# Patient Record
Sex: MALE | Race: BLACK OR AFRICAN AMERICAN | URBAN - METROPOLITAN AREA
[De-identification: names, ages, dates, MRNs, and addresses within clinical notes are randomized per-mention and may not be internally consistent; named-entity substitution may affect disease eponyms.]

---

## 2021-09-21 ENCOUNTER — OFFICE VISIT (OUTPATIENT)
Dept: URBAN - METROPOLITAN AREA CLINIC 82 | Facility: CLINIC | Age: 81
End: 2021-09-21
Payer: MEDICARE

## 2021-09-21 ENCOUNTER — WEB ENCOUNTER (OUTPATIENT)
Dept: URBAN - METROPOLITAN AREA CLINIC 82 | Facility: CLINIC | Age: 81
End: 2021-09-21

## 2021-09-21 ENCOUNTER — LAB OUTSIDE AN ENCOUNTER (OUTPATIENT)
Dept: URBAN - METROPOLITAN AREA CLINIC 82 | Facility: CLINIC | Age: 81
End: 2021-09-21

## 2021-09-21 VITALS
WEIGHT: 168.6 LBS | RESPIRATION RATE: 16 BRPM | HEART RATE: 112 BPM | BODY MASS INDEX: 31.83 KG/M2 | DIASTOLIC BLOOD PRESSURE: 76 MMHG | TEMPERATURE: 97.2 F | SYSTOLIC BLOOD PRESSURE: 151 MMHG | HEIGHT: 61 IN

## 2021-09-21 DIAGNOSIS — R10.84 ABDOMINAL CRAMPING, GENERALIZED: ICD-10-CM

## 2021-09-21 DIAGNOSIS — R19.7 DIARRHEA: ICD-10-CM

## 2021-09-21 DIAGNOSIS — D50.8 ACQUIRED IRON DEFICIENCY ANEMIA DUE TO DECREASED ABSORPTION: ICD-10-CM

## 2021-09-21 PROCEDURE — G8417 CALC BMI ABV UP PARAM F/U: HCPCS | Performed by: INTERNAL MEDICINE

## 2021-09-21 PROCEDURE — 1036F TOBACCO NON-USER: CPT | Performed by: INTERNAL MEDICINE

## 2021-09-21 PROCEDURE — G9903 PT SCRN TBCO ID AS NON USER: HCPCS | Performed by: INTERNAL MEDICINE

## 2021-09-21 PROCEDURE — 99244 OFF/OP CNSLTJ NEW/EST MOD 40: CPT | Performed by: INTERNAL MEDICINE

## 2021-09-21 PROCEDURE — 99204 OFFICE O/P NEW MOD 45 MIN: CPT | Performed by: INTERNAL MEDICINE

## 2021-09-21 PROCEDURE — G8427 DOCREV CUR MEDS BY ELIG CLIN: HCPCS | Performed by: INTERNAL MEDICINE

## 2021-09-21 RX ORDER — TRAZODONE HYDROCHLORIDE 50 MG/1
TABLET ORAL
Qty: 0 | Refills: 0 | Status: ACTIVE | COMMUNITY
Start: 1900-01-01

## 2021-09-21 RX ORDER — HYDRALAZINE HYDROCHLORIDE 25 MG/1
TABLET ORAL
Qty: 0 | Refills: 0 | Status: ACTIVE | COMMUNITY
Start: 1900-01-01

## 2021-09-21 RX ORDER — POLYETHYLENE GLYCOL-3350 AND ELECTROLYTES 236; 6.74; 5.86; 2.97; 22.74 G/274.31G; G/274.31G; G/274.31G; G/274.31G; G/274.31G
AS DIRECTED POWDER, FOR SOLUTION ORAL ONCE
Qty: 1 | Refills: 0 | OUTPATIENT
Start: 2021-09-21 | End: 2021-09-22

## 2021-09-21 RX ORDER — LEVOTHYROXINE SODIUM 0.05 MG/1
TABLET ORAL
Qty: 0 | Refills: 0 | Status: ACTIVE | COMMUNITY
Start: 1900-01-01

## 2021-09-21 RX ORDER — PANTOPRAZOLE SODIUM 40 MG/1
TABLET, DELAYED RELEASE ORAL
Qty: 0 | Refills: 0 | Status: ACTIVE | COMMUNITY
Start: 1900-01-01

## 2021-09-21 RX ORDER — ACETAMINOPHEN 325 MG/1
TABLET, FILM COATED ORAL
Qty: 0 | Refills: 0 | Status: ACTIVE | COMMUNITY
Start: 1900-01-01

## 2021-09-21 RX ORDER — ALBUTEROL SULFATE 90 UG/1
AEROSOL, METERED RESPIRATORY (INHALATION)
Qty: 0 | Refills: 0 | Status: ACTIVE | COMMUNITY
Start: 1900-01-01

## 2021-09-21 RX ORDER — CHOLESTYRAMINE 4 G/9G
1 PACKET MIXED WITH WATER OR NON-CARBONATED DRINK POWDER, FOR SUSPENSION ORAL TWICE A DAY
Qty: 60 | Refills: 1 | OUTPATIENT
Start: 2021-09-21

## 2021-09-21 RX ORDER — POTASSIUM CHLORIDE 750 MG/1
CAPSULE, EXTENDED RELEASE ORAL
Qty: 0 | Refills: 0 | Status: ACTIVE | COMMUNITY
Start: 1900-01-01

## 2021-09-21 RX ORDER — TORSEMIDE 20 MG/1
TABLET ORAL
Qty: 0 | Refills: 0 | Status: DISCONTINUED | COMMUNITY
Start: 1900-01-01

## 2021-09-21 RX ORDER — MONTELUKAST SODIUM 10 MG/1
TABLET, FILM COATED ORAL
Qty: 0 | Refills: 0 | Status: ACTIVE | COMMUNITY
Start: 1900-01-01

## 2021-09-21 RX ORDER — GUAIFENESIN AND PSEUDOEPHEDRINE HYDROCHLORIDE 1200; 120 MG/1; MG/1
TAKE 1 TABLET BY ORAL ROUTE EVERY 12 HOURS AS NEEDED TABLET, EXTENDED RELEASE ORAL 2
Qty: 0 | Refills: 0 | Status: ACTIVE | COMMUNITY
Start: 1900-01-01

## 2021-09-21 RX ORDER — LOVASTATIN 40 MG/1
TABLET ORAL
Qty: 0 | Refills: 0 | Status: ACTIVE | COMMUNITY
Start: 1900-01-01

## 2021-09-21 NOTE — HPI-TODAY'S VISIT:
9/21/2021 Patient is a 81 year old  male who presents on referral from Dr. Cristian Dave MD for evaluation of diarrhea. A copy of the note will be sent to the referring provider. PillCam on 3/1/2019 was normal. Colonoscopy in 2018 showed polyp in the ascending colon. EGD in 2018 showed gastritis. Biopsy showed no H pylori, tubular adenoma polyp.   Patient c/o constipation followed by diarrhea for the past 2 months. Bowel movements usually occur at least twice a day with occasional incontinence. She takes Lomotil without improvement. According to her caregiver, patient also gets abdominal pain and diarrhea within 2 hours after eating. Patient denies any excess stress or anxiety. She has lost about 20-25lbs in the past 5 months. She takes aspirin daily. She had pacemaker battery changed recently, and she was taken off of diuretic which is causing some feet swelling.

## 2021-09-21 NOTE — HPI-OTHER HISTORIES
2/5/2019 This is a follow-up appointment for this patient, a 78 year old /Black male, after a previous visit on 12/6/18, for an evaluation for ANEMIA . The patient presents for follow up. She had egd which showed some gastritis but h.pylori negative. Colonoscopy showed tubular adenoma and hemorrhioids. No gi source of bleeding.  Diagnostic testing has not been done.  No related laboratory studies have been done recently.  Current medications include hydralazine 25 mg oral tablet, levothyroxine 50 mcg oral tablet, lovastatin 40 mg oral tablet, montelukast 10 mg oral tablet, Mucinex D Maximum Strength 120-1,200 mg oral tablet extended release 12 hr, pantoprazole 40 mg oral tablet,delayed release (DR/EC), potassium chloride 10 mEq oral capsule, extended release, ProAir HFA 90 mcg/actuation inhalation HFA aerosol inhaler, torsemide 20 mg oral tablet, trazodone 50 mg oral tablet, Trelegy Ellipta 100-62.5-25 mcg inhalation blister with device, and Tylenol 325 mg oral tablet.     This is a follow-up appointment for this patient, a 78 year old /Black male, after a previous visit on 12/6/18, for an evaluation for ANEMIA . The patient presents for follow up. She had egd which showed some gastritis but h.pylori negative. Colonoscopy showed tubular adenoma and hemorrhioids. No gi source of bleeding.  Diagnostic testing has not been done.  No related laboratory studies have been done recently.  Current medications include hydralazine 25 mg oral tablet, levothyroxine 50 mcg oral tablet, lovastatin 40 mg oral tablet, montelukast 10 mg oral tablet, Mucinex D Maximum Strength 120-1,200 mg oral tablet extended release 12 hr, pantoprazole 40 mg oral tablet,delayed release (DR/EC), potassium chloride 10 mEq oral capsule, extended release, ProAir HFA 90 mcg/actuation inhalation HFA aerosol inhaler, torsemide 20 mg oral tablet, trazodone 50 mg oral tablet, Trelegy Ellipta 100-62.5-25 mcg inhalation blister with device, and Tylenol 325 mg oral tablet.      3/19/19 PATIENT HAD PILLCAM FOR ANEMIA. PILLCAM SHOWED GASTRITIS OTHERWISE NORMAL SMALL BOWEL WITHOUT LESION TO EXPLAIN GI SOURCE OF ANEMIA. PREVIOUS EGD/COLONOSCOPY RESULTS IN THE CHART. PATIENT IS ASYMPTOMATIC.

## 2021-10-12 ENCOUNTER — OFFICE VISIT (OUTPATIENT)
Dept: URBAN - METROPOLITAN AREA MEDICAL CENTER 24 | Facility: MEDICAL CENTER | Age: 81
End: 2021-10-12
Payer: MEDICARE

## 2021-10-12 DIAGNOSIS — R19.7 ACUTE DIARRHEA: ICD-10-CM

## 2021-10-12 PROCEDURE — 45380 COLONOSCOPY AND BIOPSY: CPT | Performed by: INTERNAL MEDICINE

## 2021-11-17 ENCOUNTER — DASHBOARD ENCOUNTERS (OUTPATIENT)
Age: 81
End: 2021-11-17

## 2021-11-17 PROBLEM — 87522002 IRON DEFICIENCY ANEMIA: Status: ACTIVE | Noted: 2021-09-21

## 2021-11-17 PROBLEM — 62315008 DIARRHEA: Status: ACTIVE | Noted: 2021-09-21

## 2021-11-17 PROBLEM — 21522001 ABDOMINAL PAIN: Status: ACTIVE | Noted: 2021-09-21

## 2021-11-19 ENCOUNTER — OFFICE VISIT (OUTPATIENT)
Dept: URBAN - METROPOLITAN AREA CLINIC 82 | Facility: CLINIC | Age: 81
End: 2021-11-19

## 2021-11-19 RX ORDER — CHOLESTYRAMINE 4 G/9G
1 PACKET MIXED WITH WATER OR NON-CARBONATED DRINK POWDER, FOR SUSPENSION ORAL TWICE A DAY
Qty: 60 | Refills: 1 | OUTPATIENT

## 2021-11-19 NOTE — HPI-TODAY'S VISIT:
9/21/2021 Patient is a 81 year old  male who presents on referral from Dr. Cristian Dave MD for evaluation of diarrhea. A copy of the note will be sent to the referring provider. PillCam on 3/1/2019 was normal. Colonoscopy in 2018 showed polyp in the ascending colon. EGD in 2018 showed gastritis. Biopsy showed no H pylori, tubular adenoma polyp.   Patient c/o constipation followed by diarrhea for the past 2 months. Bowel movements usually occur at least twice a day with occasional incontinence. She takes Lomotil without improvement. According to her caregiver, patient also gets abdominal pain and diarrhea within 2 hours after eating. Patient denies any excess stress or anxiety. She has lost about 20-25lbs in the past 5 months. She takes aspirin daily. She had pacemaker battery changed recently, and she was taken off of diuretic which is causing some feet swelling.  11/19/2021 Patient presents for a follow up visit.

## 2022-10-11 NOTE — PHYSICAL EXAM EYES:
Conjuntivae and eyelids appear normal,  Sclerae : White without injection Paramedian Forehead Flap Text: A decision was made to reconstruct the defect utilizing an interpolation axial flap and a staged reconstruction.  A telfa template was made of the defect.  This telfa template was then used to outline the paramedian forehead pedicle flap.  The donor area for the pedicle flap was then injected with anesthesia.  The flap was excised through the skin and subcutaneous tissue down to the layer of the underlying musculature.  The pedicle flap was carefully excised within this deep plane to maintain its blood supply.  The edges of the donor site were undermined.   The donor site was closed in a primary fashion.  The pedicle was then rotated into position and sutured.  Once the tube was sutured into place, adequate blood supply was confirmed with blanching and refill.  The pedicle was then wrapped with xeroform gauze and dressed appropriately with a telfa and gauze bandage to ensure continued blood supply and protect the attached pedicle.